# Patient Record
Sex: FEMALE | Race: WHITE | NOT HISPANIC OR LATINO | Employment: PART TIME | URBAN - METROPOLITAN AREA
[De-identification: names, ages, dates, MRNs, and addresses within clinical notes are randomized per-mention and may not be internally consistent; named-entity substitution may affect disease eponyms.]

---

## 2024-07-30 PROBLEM — M79.672 LEFT FOOT PAIN: Status: ACTIVE | Noted: 2024-07-30

## 2024-07-30 PROBLEM — J02.9 SORE THROAT: Status: ACTIVE | Noted: 2024-07-30

## 2024-07-30 PROBLEM — R05.9 COUGH: Status: ACTIVE | Noted: 2024-07-30

## 2024-07-30 PROBLEM — J02.9 ACUTE PHARYNGITIS: Status: ACTIVE | Noted: 2024-07-30

## 2024-07-30 PROBLEM — M19.079 ARTHRITIS OF MIDFOOT: Status: ACTIVE | Noted: 2024-07-30

## 2024-07-30 PROBLEM — T78.3XXA ANGIOEDEMA: Status: ACTIVE | Noted: 2024-07-30

## 2024-07-30 PROBLEM — E66.01 MORBID OBESITY (MULTI): Status: ACTIVE | Noted: 2024-07-30

## 2024-07-30 PROBLEM — M21.42 ACQUIRED PES PLANOVALGUS OF LEFT FOOT: Status: ACTIVE | Noted: 2024-07-30

## 2024-07-30 PROBLEM — L50.9 URTICARIA: Status: ACTIVE | Noted: 2024-07-30

## 2024-07-30 RX ORDER — LEVOTHYROXINE SODIUM 150 UG/1
TABLET ORAL
COMMUNITY
Start: 2017-01-31 | End: 2024-07-31 | Stop reason: WASHOUT

## 2024-07-30 RX ORDER — ERGOCALCIFEROL 1.25 MG/1
1 CAPSULE ORAL
COMMUNITY
Start: 2017-08-01

## 2024-07-30 RX ORDER — MINERAL OIL
1 ENEMA (ML) RECTAL DAILY
COMMUNITY
Start: 2017-07-31

## 2024-07-30 RX ORDER — BUPROPION HYDROCHLORIDE 100 MG/1
TABLET, EXTENDED RELEASE ORAL
COMMUNITY
Start: 2017-01-31 | End: 2024-07-31 | Stop reason: WASHOUT

## 2024-07-30 RX ORDER — PREDNISONE 50 MG/1
TABLET ORAL
COMMUNITY
Start: 2017-02-01

## 2024-07-30 RX ORDER — LISINOPRIL 10 MG/1
TABLET ORAL
COMMUNITY
Start: 2017-07-31

## 2024-07-30 RX ORDER — FLUOXETINE HYDROCHLORIDE 40 MG/1
CAPSULE ORAL
COMMUNITY
Start: 2017-01-31

## 2024-07-31 ENCOUNTER — OFFICE VISIT (OUTPATIENT)
Dept: UROLOGY | Facility: CLINIC | Age: 64
End: 2024-07-31
Payer: COMMERCIAL

## 2024-07-31 VITALS
BODY MASS INDEX: 42.59 KG/M2 | HEIGHT: 66 IN | SYSTOLIC BLOOD PRESSURE: 139 MMHG | DIASTOLIC BLOOD PRESSURE: 90 MMHG | WEIGHT: 265 LBS | HEART RATE: 77 BPM | TEMPERATURE: 98.1 F

## 2024-07-31 DIAGNOSIS — N39.3 STRESS INCONTINENCE: ICD-10-CM

## 2024-07-31 DIAGNOSIS — R39.15 URGENCY OF MICTURITION: ICD-10-CM

## 2024-07-31 DIAGNOSIS — R33.9 URINE RETENTION: ICD-10-CM

## 2024-07-31 DIAGNOSIS — N39.41 URGE INCONTINENCE: Primary | ICD-10-CM

## 2024-07-31 DIAGNOSIS — R35.1 NOCTURIA: ICD-10-CM

## 2024-07-31 DIAGNOSIS — F98.0 ENURESIS, PRIMARY, FUNCTIONAL: ICD-10-CM

## 2024-07-31 LAB
POC APPEARANCE, URINE: CLEAR
POC BILIRUBIN, URINE: NEGATIVE
POC BLOOD, URINE: ABNORMAL
POC COLOR, URINE: YELLOW
POC GLUCOSE, URINE: NEGATIVE MG/DL
POC KETONES, URINE: NEGATIVE MG/DL
POC LEUKOCYTES, URINE: NEGATIVE
POC NITRITE,URINE: NEGATIVE
POC PH, URINE: 6 PH
POC PROTEIN, URINE: NEGATIVE MG/DL
POC SPECIFIC GRAVITY, URINE: 1.01
POC UROBILINOGEN, URINE: 0.2 EU/DL

## 2024-07-31 PROCEDURE — 99213 OFFICE O/P EST LOW 20 MIN: CPT | Performed by: UROLOGY

## 2024-07-31 PROCEDURE — 3008F BODY MASS INDEX DOCD: CPT | Performed by: UROLOGY

## 2024-07-31 PROCEDURE — 81003 URINALYSIS AUTO W/O SCOPE: CPT | Performed by: UROLOGY

## 2024-07-31 RX ORDER — LEVOTHYROXINE SODIUM 125 UG/1
TABLET ORAL
COMMUNITY
Start: 2024-04-11

## 2024-07-31 RX ORDER — ALPRAZOLAM 0.5 MG/1
TABLET ORAL
COMMUNITY
Start: 2024-07-26

## 2024-07-31 RX ORDER — BUPROPION HYDROCHLORIDE 100 MG/1
TABLET ORAL
COMMUNITY
Start: 2024-07-11

## 2024-07-31 RX ORDER — AMOXICILLIN 500 MG/1
CAPSULE ORAL
COMMUNITY
Start: 2024-02-19

## 2024-07-31 RX ORDER — VIBEGRON 75 MG/1
TABLET, FILM COATED ORAL
COMMUNITY

## 2024-07-31 SDOH — ECONOMIC STABILITY: FOOD INSECURITY: WITHIN THE PAST 12 MONTHS, YOU WORRIED THAT YOUR FOOD WOULD RUN OUT BEFORE YOU GOT MONEY TO BUY MORE.: NEVER TRUE

## 2024-07-31 SDOH — ECONOMIC STABILITY: FOOD INSECURITY: WITHIN THE PAST 12 MONTHS, THE FOOD YOU BOUGHT JUST DIDN'T LAST AND YOU DIDN'T HAVE MONEY TO GET MORE.: NEVER TRUE

## 2024-07-31 ASSESSMENT — ENCOUNTER SYMPTOMS
OCCASIONAL FEELINGS OF UNSTEADINESS: 0
DEPRESSION: 0
LOSS OF SENSATION IN FEET: 0

## 2024-07-31 ASSESSMENT — COLUMBIA-SUICIDE SEVERITY RATING SCALE - C-SSRS
2. HAVE YOU ACTUALLY HAD ANY THOUGHTS OF KILLING YOURSELF?: NO
6. HAVE YOU EVER DONE ANYTHING, STARTED TO DO ANYTHING, OR PREPARED TO DO ANYTHING TO END YOUR LIFE?: NO
1. IN THE PAST MONTH, HAVE YOU WISHED YOU WERE DEAD OR WISHED YOU COULD GO TO SLEEP AND NOT WAKE UP?: NO

## 2024-07-31 ASSESSMENT — LIFESTYLE VARIABLES
SKIP TO QUESTIONS 9-10: 1
HOW OFTEN DO YOU HAVE SIX OR MORE DRINKS ON ONE OCCASION: NEVER
AUDIT-C TOTAL SCORE: 3
HOW MANY STANDARD DRINKS CONTAINING ALCOHOL DO YOU HAVE ON A TYPICAL DAY: 1 OR 2
HOW OFTEN DO YOU HAVE A DRINK CONTAINING ALCOHOL: 2-3 TIMES A WEEK

## 2024-07-31 ASSESSMENT — PATIENT HEALTH QUESTIONNAIRE - PHQ9
1. LITTLE INTEREST OR PLEASURE IN DOING THINGS: NOT AT ALL
2. FEELING DOWN, DEPRESSED OR HOPELESS: NOT AT ALL
SUM OF ALL RESPONSES TO PHQ9 QUESTIONS 1 AND 2: 0

## 2024-07-31 ASSESSMENT — PAIN SCALES - GENERAL: PAINLEVEL: 0-NO PAIN

## 2024-07-31 NOTE — PROGRESS NOTES
Subjective   Patient ID: Yumi Esqueda is a 64 y.o. female who presents for Urinary Retention (FOLLOW UP).    HPI  PT PRESENTS FOR A F/U ON HER VOIDING DYSFUNCTION.  PT WAS ON GEMTESA 75 MG /DAY -- IT HELPED SOME BUT NOT ENOUGH TO KEEP HER DRY.  PT WOULD LIKE TO TRY INTRAVESICAL BOTOX.  INSTEAD OF USING MORE OAB MEDS   Are you experiencing:  Burning on urination -- NO  Pain on urination  -- NO  Urinary frequency -- YES -- EVERY 2 HOURS ON AVG  Urinary urgency -- YES  Urge incontinence --YES  Urinary stress incontinence  -- YES-- OCC -- URGE INCONTINENCE IS WORSE  Number of pads used per day --- 2 -3 ON AVG -- DAMP TO WET  PT HAS ENEURESIS  Nocturia-- 3 -4 ON AVG  Hematuria -- NO  Hesitancy -- NO  Post void fullness --   NO     Review of Systems  General-- No C/O fever or chills  Head-- No C/O Dizziness  Eyes-- NO  C/O blurry or double vision  Ears-- No C/O hearing loss  Neck-- Supple  Chest-- No C/O pain or discomfort  Lungs-- No C/O shortness of breath  Abdomen-- No C/O  pain or discomfort, No nausea or vomiting  Back-- No C/O back pain or discomfort  Extremities-- No C/O swelling or pain    Objective   Physical Exam    General-- well developed, well nourished in NAD  Head-- normal cephalic, atraumatic  Eyes-- PERRL, EOM'S FROM,  no  jaundice  Neck-- Supple, without masses  Chest-- Normal bony structure  Abdomen-- soft, non tender, liver spleen not tender. No supra pubic masses  Back-- no flank masses palpable, no CVA tenderness on palpation or perc;ussion, OBESE  Lymph nodes-- No inguinal lymphadenopathy noted  Extremities -- Normal muscle mass and tone for the patients age  Neurological-- oriented times three    PVR 38 ml    Assessment/Plan  A:  PERSISTENT URINARY URGE INCONTINENCE -- PT DOES NOT WANT TO USE ANY MORE OAB MEDS AT THIS TIME -- SHE WOULD LIKE TO TRY INTRAVESICAL BOTOX  PROCEDURE, PROS, CONS DISCUSSED IN DETAIL  ALL QUESTIONS ANSWERED    P:  URINE FOR A MICRO -- PT HAS HEMOGLOBINURIA ON THE DIP  STICK  PRE CERT FOR  100 UNIT OF INTRAVESICAL BOTOX, OUT PT, JERRI FITCH.    Kishan Thomas MD 07/31/24 4:03 PM

## 2024-08-06 ENCOUNTER — PREP FOR PROCEDURE (OUTPATIENT)
Dept: UROLOGY | Facility: CLINIC | Age: 64
End: 2024-08-06
Payer: COMMERCIAL

## 2024-08-06 DIAGNOSIS — N39.41 URGENCY INCONTINENCE: Primary | ICD-10-CM

## 2024-08-06 DIAGNOSIS — N39.46 URGE AND STRESS INCONTINENCE: ICD-10-CM

## 2024-08-06 DIAGNOSIS — N39.41 URGE INCONTINENCE: ICD-10-CM

## 2024-08-06 RX ORDER — SODIUM CHLORIDE 9 MG/ML
100 INJECTION, SOLUTION INTRAVENOUS CONTINUOUS
OUTPATIENT
Start: 2024-08-06

## 2024-08-06 RX ORDER — CEFAZOLIN SODIUM 2 G/100ML
2 INJECTION, SOLUTION INTRAVENOUS ONCE
OUTPATIENT
Start: 2024-08-06 | End: 2024-08-06

## 2024-08-06 NOTE — H&P
History Of Present Illness  Yumi Esqueda is a 64 y.o. female presenting with URGE INCONTINENCE.     Past Medical History  She has a past medical history of Urinary incontinence.    Surgical History  She has no past surgical history on file.     Social History  She reports that she has quit smoking. Her smoking use included cigarettes. She has never used smokeless tobacco. She reports that she does not currently use alcohol. She reports that she does not use drugs.    Family History  Family History   Problem Relation Name Age of Onset    No Known Problems Father      Cancer Mother          Allergies  Patient has no known allergies.    Review of Systems -- SEE RECENT PHYSICAL EXAM     Physical Exam SEE RECENT PHYSICAL EXAM     Last Recorded Vitals  There were no vitals taken for this visit.    Relevant Results    No results found for this or any previous visit (from the past 24 hour(s)).    No results found.         Assessment/Plan       A:  URGE INCONTINENCE -- PT WOULD LIKE TO TRY INTRAVESICAL BOTOX 100 UNITS  P:  SCHEDULE:  CYSTOSCOPY, INTRAVESICAL BOTOX 100 UNITS , OUT PT, JERRI Thomas MD

## 2024-08-08 PROBLEM — N39.41 URGENCY INCONTINENCE: Status: ACTIVE | Noted: 2024-08-06

## 2024-08-22 ENCOUNTER — PRE-ADMISSION TESTING (OUTPATIENT)
Dept: PREADMISSION TESTING | Facility: HOSPITAL | Age: 64
End: 2024-08-22
Payer: COMMERCIAL

## 2024-08-22 VITALS
WEIGHT: 260.8 LBS | TEMPERATURE: 97.2 F | RESPIRATION RATE: 16 BRPM | SYSTOLIC BLOOD PRESSURE: 145 MMHG | BODY MASS INDEX: 41.91 KG/M2 | HEART RATE: 70 BPM | DIASTOLIC BLOOD PRESSURE: 81 MMHG | HEIGHT: 66 IN | OXYGEN SATURATION: 98 %

## 2024-08-22 DIAGNOSIS — N39.41 URGE INCONTINENCE: ICD-10-CM

## 2024-08-22 DIAGNOSIS — N39.41 URGENCY INCONTINENCE: ICD-10-CM

## 2024-08-22 LAB
ANION GAP SERPL CALC-SCNC: 14 MMOL/L (ref 10–20)
APPEARANCE UR: CLEAR
BACTERIA #/AREA URNS AUTO: ABNORMAL /HPF
BASOPHILS # BLD AUTO: 0.03 X10*3/UL (ref 0–0.1)
BASOPHILS NFR BLD AUTO: 0.5 %
BILIRUB UR STRIP.AUTO-MCNC: NEGATIVE MG/DL
BUN SERPL-MCNC: 17 MG/DL (ref 6–23)
CALCIUM SERPL-MCNC: 9.5 MG/DL (ref 8.6–10.3)
CHLORIDE SERPL-SCNC: 104 MMOL/L (ref 98–107)
CO2 SERPL-SCNC: 22 MMOL/L (ref 21–32)
COLOR UR: ABNORMAL
CREAT SERPL-MCNC: 0.77 MG/DL (ref 0.5–1.05)
EGFRCR SERPLBLD CKD-EPI 2021: 86 ML/MIN/1.73M*2
EOSINOPHIL # BLD AUTO: 0.05 X10*3/UL (ref 0–0.7)
EOSINOPHIL NFR BLD AUTO: 0.8 %
ERYTHROCYTE [DISTWIDTH] IN BLOOD BY AUTOMATED COUNT: 12.7 % (ref 11.5–14.5)
GLUCOSE SERPL-MCNC: 95 MG/DL (ref 74–99)
GLUCOSE UR STRIP.AUTO-MCNC: NORMAL MG/DL
HCT VFR BLD AUTO: 38.2 % (ref 36–46)
HGB BLD-MCNC: 13 G/DL (ref 12–16)
IMM GRANULOCYTES # BLD AUTO: 0.02 X10*3/UL (ref 0–0.7)
IMM GRANULOCYTES NFR BLD AUTO: 0.3 % (ref 0–0.9)
KETONES UR STRIP.AUTO-MCNC: NEGATIVE MG/DL
LEUKOCYTE ESTERASE UR QL STRIP.AUTO: ABNORMAL
LYMPHOCYTES # BLD AUTO: 1.71 X10*3/UL (ref 1.2–4.8)
LYMPHOCYTES NFR BLD AUTO: 28.6 %
MCH RBC QN AUTO: 30.4 PG (ref 26–34)
MCHC RBC AUTO-ENTMCNC: 34 G/DL (ref 32–36)
MCV RBC AUTO: 89 FL (ref 80–100)
MONOCYTES # BLD AUTO: 0.41 X10*3/UL (ref 0.1–1)
MONOCYTES NFR BLD AUTO: 6.9 %
MUCOUS THREADS #/AREA URNS AUTO: ABNORMAL /LPF
NEUTROPHILS # BLD AUTO: 3.76 X10*3/UL (ref 1.2–7.7)
NEUTROPHILS NFR BLD AUTO: 62.9 %
NITRITE UR QL STRIP.AUTO: NEGATIVE
NRBC BLD-RTO: 0 /100 WBCS (ref 0–0)
PH UR STRIP.AUTO: 6 [PH]
PLATELET # BLD AUTO: 290 X10*3/UL (ref 150–450)
POTASSIUM SERPL-SCNC: 4.3 MMOL/L (ref 3.5–5.3)
PROT UR STRIP.AUTO-MCNC: NEGATIVE MG/DL
RBC # BLD AUTO: 4.28 X10*6/UL (ref 4–5.2)
RBC # UR STRIP.AUTO: ABNORMAL /UL
RBC #/AREA URNS AUTO: ABNORMAL /HPF
SODIUM SERPL-SCNC: 136 MMOL/L (ref 136–145)
SP GR UR STRIP.AUTO: 1.02
SQUAMOUS #/AREA URNS AUTO: ABNORMAL /HPF
UROBILINOGEN UR STRIP.AUTO-MCNC: NORMAL MG/DL
WBC # BLD AUTO: 6 X10*3/UL (ref 4.4–11.3)
WBC #/AREA URNS AUTO: ABNORMAL /HPF

## 2024-08-22 PROCEDURE — 99203 OFFICE O/P NEW LOW 30 MIN: CPT | Performed by: NURSE PRACTITIONER

## 2024-08-22 PROCEDURE — 93005 ELECTROCARDIOGRAM TRACING: CPT

## 2024-08-22 PROCEDURE — 36415 COLL VENOUS BLD VENIPUNCTURE: CPT

## 2024-08-22 PROCEDURE — 80048 BASIC METABOLIC PNL TOTAL CA: CPT

## 2024-08-22 PROCEDURE — 87086 URINE CULTURE/COLONY COUNT: CPT | Mod: PARLAB

## 2024-08-22 PROCEDURE — 81003 URINALYSIS AUTO W/O SCOPE: CPT

## 2024-08-22 PROCEDURE — 85025 COMPLETE CBC W/AUTO DIFF WBC: CPT

## 2024-08-22 NOTE — PREPROCEDURE INSTRUCTIONS
Medication List            Accurate as of August 22, 2024  1:24 PM. Always use your most recent med list.                Allegra Allergy 180 mg tablet  Generic drug: fexofenadine  Medication Adjustments for Surgery: Other (Comment)  Notes to patient: Continue as prescribed     ALPRAZolam 0.5 mg tablet  Commonly known as: Xanax  Medication Adjustments for Surgery: Other (Comment)  Notes to patient: Continue as prescribed     amoxicillin 500 mg capsule  Commonly known as: Amoxil  Medication Adjustments for Surgery: Other (Comment)  Notes to patient: Continue as prescribed     buPROPion 100 mg tablet  Commonly known as: Wellbutrin  Medication Adjustments for Surgery: Other (Comment)  Notes to patient: Continue as prescribed     ergocalciferol 1.25 MG (16995 UT) capsule  Commonly known as: Vitamin D-2  Medication Adjustments for Surgery: Stop 7 days before surgery     Gemtesa 75 mg tablet  Generic drug: vibegron  Medication Adjustments for Surgery: Other (Comment)  Notes to patient: Continue as prescribed     lisinopril 10 mg tablet  Medication Adjustments for Surgery: Other (Comment)  Notes to patient: Do not take night before or day of surgery - (Do not take 24 hours before surgery)     predniSONE 50 mg tablet  Commonly known as: Deltasone  Medication Adjustments for Surgery: Other (Comment)  Notes to patient: Continue as prescribed     PROzac 40 mg capsule  Generic drug: FLUoxetine  Medication Adjustments for Surgery: Other (Comment)  Notes to patient: Continue as prescribed     raNITIdine 75 mg tablet  Commonly known as: Zantac  Medication Adjustments for Surgery: Other (Comment)  Notes to patient: Continue as prescribed     Synthroid 125 mcg tablet  Generic drug: levothyroxine  Medication Adjustments for Surgery: Other (Comment)  Notes to patient: Continue as prescribed            PRE-OPERATIVE INSTRUCTIONS FOR SURGERY    *Do not eat anything after midnight the night of surgery.  This includes food of any kind  (including hard candy, cough drops, mints).   You may have up to 13.5 ounces of clear liquid  until TWO hours prior to your arrival time to the hospital  Clear liquids include water, black tea/coffee, (no milk or cream) apple juice and electrolyte drinks (GATORADE).  You may chew gum until TWO hours prior you your surgery/procedure.       PLEASE REVIEW YOUR MEDICATION LIST so as to identify which medications should be held or stopped prior to surgery, as well as what medications must be taken the morning of your procedure with sips of clear liquid.      ------------------------------------------------    *One of our staff members will call you ONE business day before your surgery, between 11am-2 pm to let you know the time to arrive.    If you have not received a call by 2 pm, call [phone]    *When you arrive at the hospital-->GO TO Registration on the ground floor    *Stop smoking 24 hours prior to surgery.  No Marijuana, CBD Oil or Vaping for 48 hours    *No alcohol 24 hours prior to surgery    *You will need a responsible adult to drive you home    -No acrylic nails or nail polish on at least one fingernail, NO polish on toes for foot surgery    Take a shower prior to surgery so as to remove all body residue from applied products such as lotions, perfumes, makeup, lotions, and deodorant.  DO NOT REAPPLY ANY OF THESE ITEMS AFTER SHOWERING.    -You may be asked to remove your dentures, partial plate, eyeglasses or contact lenses before going to surgery.  Please bring a case for these items.    -Body piercings need to be removed.  Jewelry and valuables should be left at home.    -Put on loose,  comfortable, clean clothing, that will accommodate bandages    -------------------------------------------------      What you may be asked to bring to surgery:     ___PHOTO ID and insurance information      -------------------------------------------------    NPO Instructions:    Do not eat any food after midnight the  night before your surgery/procedure.  You may have clear liquids until TWO hours before surgery/procedure. This includes water, black tea/coffee, (no milk or cream) apple juice and electrolyte drinks (Gatorade).  You may chew gum up to TWO hours before your surgery/procedure.    Additional Instructions:     Day of Surgery:  You may have clear liquids until TWO hours before surgery/procedure.  This includes water, black tea/coffee, (no milk or cream) apple juice and electrolyte drinks (Gatorade)  You may chew gum up to TWO hours before your surgery/procedure  Wear  comfortable loose fitting clothing  Do not use moisturizers, creams, lotions or perfume  All jewelry and valuables should be left at home      -------------------------------------------------

## 2024-08-22 NOTE — CPM/PAT H&P
CPM/PAT Evaluation       Name: Yumi Esqueda (Yumi Esqueda)  /Age: 64 y.o.     In-Person       Chief Complaint: Urgency incontinence    64 yr old female with c/o urge incontinence. Reports years long bothersome urinary symptoms including frequency, urgency, nocturia (4x per night) and incontinence with need to wear pads.  Denies discomfort, hematuria, difficulty emptying bladder or recent infection.  Has tried various treatments including medication (gemtesa) with no lasting relief.  Reports remaining otherwise physically active, enjoying yard work and swimming; denies cardiac or respiratory symptoms.  Reports hx of post op nausea.       Followed by PCP (BRENDA Cardenas) - last visit this year        Past Medical History:   Diagnosis Date    PONV (postoperative nausea and vomiting)     Urinary incontinence        Past Surgical History:   Procedure Laterality Date    FOOT SURGERY Left 2024    KNEE ARTHROPLASTY Left     TOTAL SHOULDER ARTHROPLASTY Bilateral      and        Patient  has no history on file for sexual activity.    Family History   Problem Relation Name Age of Onset    No Known Problems Father      Cancer Mother         No Known Allergies    Prior to Admission medications    Medication Sig Start Date End Date Taking? Authorizing Provider   ALPRAZolam (Xanax) 0.5 mg tablet TAKE 1 TABLET BY MOUTH TWICE DAILY FOR ANXIETY and insomnia AS NEEDED 24   Historical Provider, MD   amoxicillin (Amoxil) 500 mg capsule TAKE BY MOUTH AS DIRECTED ( TAKE 2 CAPSULES BY MOUTH 1 HOUR PRIOR TO DENTAL PROCEDURE AND THEN TAKE 2 CAPSULES BY MOUTH 6 HOURS AFTER DENTAL 24   Historical Provider, MD   buPROPion (Wellbutrin) 100 mg tablet TAKES 1/2 TABLET 24   Historical Provider, MD   ergocalciferol (Vitamin D-2) 1.25 MG (41397 UT) capsule Take 1 capsule (1,250 mcg) by mouth 1 (one) time per week. 17   Historical Provider, MD   fexofenadine (Allegra Allergy) 180 mg tablet Take 1  tablet (180 mg) by mouth once daily. 7/31/17   Historical Provider, MD   FLUoxetine (PROzac) 40 mg capsule Take by mouth. 1/31/17   Historical Provider, MD   lisinopril 10 mg tablet Take by mouth. 7/31/17   Historical Provider, MD   predniSONE (Deltasone) 50 mg tablet Take by mouth. 2/1/17   Historical Provider, MD   raNITIdine (Zantac) 75 mg tablet Take by mouth. 1/31/17   Historical Provider, MD   Synthroid 125 mcg tablet  4/11/24   Historical Provider, MD   vibegron (Gemtesa) 75 mg tablet     Historical Provider, MD        Review of Systems    Constitutional: no fever, no chills and not feeling poorly.   Eyes: no eyesight problems.   ENT: no hearing loss, no nosebleeds and no sore throat.   Cardiovascular: no chest pain, no palpitations and no extremity edema.   Respiratory: no shortness of breath, no wheezing, no cough and no sob w/exertion, EUNICE w/no CPAP.   Gastrointestinal: negative for abdominal pain, blood in stools or changes in bowel habits   Genitourinary: see HPI  Musculoskeletal: no arthralgias and no gait abnormality.   Integumentary: negative for lesions, rash or itching.   Neurological: negative for confusion, dizziness or fainting.   Psychiatric: not suicidal, no anxiety and no depression.   All other systems have been reviewed and are negative for complaint.     Physical Exam  Constitutional:       General: No acute distress.     Aox3, pleasant and cooperative, appropriate mood and eye contact   HENT:      Head: Normocephalic.      Mouth/Throat: Mucous membranes moist & pink  Eyes:      Vision grossly intact, PERRLA   Neck:      No carotid bruit, no JVD  Cardiovascular:      RRR, S1S2, no murmurs, rubs or gallops  Pulmonary:      Symmetric chest expansion, CTA, Room Air  Abdominal:      Soft non-tender, BSx4   Skin:     Warm, dry & intact   Extremities:      No gross deformities; normal gait   Neurological:      No focal deficit, Aox3, MCINTYRE x4  Psychiatric:      Pleasant & cooperative, appropriate  affect    PAT AIRWAY:   Airway:     Mallampati::  I    TM distance::  >3 FB    Neck ROM::  Full  normal        Visit Vitals  /81   Pulse 70   Temp 36.2 °C (97.2 °F)   Resp 16       DASI Risk Score    No data to display       Caprini DVT Assessment    No data to display       Modified Frailty Index    No data to display       CHADS2 Stroke Risk  Current as of 18 minutes ago        N/A 3 to 100%: High Risk   2 to < 3%: Medium Risk   0 to < 2%: Low Risk     Last Change: N/A          This score determines the patient's risk of having a stroke if the patient has atrial fibrillation.        This score is not applicable to this patient. Components are not calculated.          Revised Cardiac Risk Index    No data to display       Apfel Simplified Score    No data to display       Risk Analysis Index Results This Encounter    No data found in the last 1 encounters.       Stop Bang Score      Flowsheet Row Most Recent Value   Do you snore loudly? 0   Do you often feel tired or fatigued after your sleep? 0   Do you have or are you being treated for high blood pressure? 1   Recent BMI (Calculated) 42.8   Is BMI greater than 35 kg/m2? 1=Yes   Age older than 50 years old? 1=Yes   Is your neck circumference greater than 17 inches (Male) or 16 inches (Female)? 0   Gender - Male 0=No            Assessment and Plan:     Followed by urology, urgency incontinence    Cystoscopy and botulinum toxin injection w/Dr Thomas on 9/3/2024    Ecg performed today - NSR (71 bpm)

## 2024-08-23 LAB
ATRIAL RATE: 71 BPM
BACTERIA UR CULT: NORMAL
HOLD SPECIMEN: NORMAL
P AXIS: 50 DEGREES
P OFFSET: 201 MS
P ONSET: 143 MS
PR INTERVAL: 160 MS
Q ONSET: 223 MS
QRS COUNT: 12 BEATS
QRS DURATION: 78 MS
QT INTERVAL: 410 MS
QTC CALCULATION(BAZETT): 445 MS
QTC FREDERICIA: 433 MS
R AXIS: 2 DEGREES
T AXIS: 30 DEGREES
T OFFSET: 428 MS
VENTRICULAR RATE: 71 BPM

## 2024-08-26 LAB
ATRIAL RATE: 71 BPM
P AXIS: 50 DEGREES
P OFFSET: 201 MS
P ONSET: 143 MS
PR INTERVAL: 160 MS
Q ONSET: 223 MS
QRS COUNT: 12 BEATS
QRS DURATION: 78 MS
QT INTERVAL: 410 MS
QTC CALCULATION(BAZETT): 445 MS
QTC FREDERICIA: 433 MS
R AXIS: 2 DEGREES
T AXIS: 30 DEGREES
T OFFSET: 428 MS
VENTRICULAR RATE: 71 BPM

## 2024-09-03 ENCOUNTER — ANESTHESIA (OUTPATIENT)
Dept: OPERATING ROOM | Facility: HOSPITAL | Age: 64
End: 2024-09-03
Payer: COMMERCIAL

## 2024-09-03 ENCOUNTER — HOSPITAL ENCOUNTER (OUTPATIENT)
Facility: HOSPITAL | Age: 64
Setting detail: OUTPATIENT SURGERY
Discharge: HOME | End: 2024-09-03
Attending: UROLOGY | Admitting: UROLOGY
Payer: COMMERCIAL

## 2024-09-03 ENCOUNTER — ANESTHESIA EVENT (OUTPATIENT)
Dept: OPERATING ROOM | Facility: HOSPITAL | Age: 64
End: 2024-09-03
Payer: COMMERCIAL

## 2024-09-03 VITALS
TEMPERATURE: 98.2 F | BODY MASS INDEX: 41.99 KG/M2 | HEART RATE: 60 BPM | OXYGEN SATURATION: 98 % | WEIGHT: 260.14 LBS | DIASTOLIC BLOOD PRESSURE: 70 MMHG | SYSTOLIC BLOOD PRESSURE: 129 MMHG | RESPIRATION RATE: 20 BRPM

## 2024-09-03 DIAGNOSIS — N39.41 URGENCY INCONTINENCE: ICD-10-CM

## 2024-09-03 PROBLEM — G47.33 OSA (OBSTRUCTIVE SLEEP APNEA): Status: ACTIVE | Noted: 2024-09-03

## 2024-09-03 PROCEDURE — 2720000007 HC OR 272 NO HCPCS: Performed by: UROLOGY

## 2024-09-03 PROCEDURE — 3600000008 HC OR TIME - EACH INCREMENTAL 1 MINUTE - PROCEDURE LEVEL THREE: Performed by: UROLOGY

## 2024-09-03 PROCEDURE — 2500000005 HC RX 250 GENERAL PHARMACY W/O HCPCS: Performed by: ANESTHESIOLOGIST ASSISTANT

## 2024-09-03 PROCEDURE — 3700000001 HC GENERAL ANESTHESIA TIME - INITIAL BASE CHARGE: Performed by: UROLOGY

## 2024-09-03 PROCEDURE — 2500000004 HC RX 250 GENERAL PHARMACY W/ HCPCS (ALT 636 FOR OP/ED): Performed by: ANESTHESIOLOGIST ASSISTANT

## 2024-09-03 PROCEDURE — 7100000009 HC PHASE TWO TIME - INITIAL BASE CHARGE: Performed by: UROLOGY

## 2024-09-03 PROCEDURE — 7100000002 HC RECOVERY ROOM TIME - EACH INCREMENTAL 1 MINUTE: Performed by: UROLOGY

## 2024-09-03 PROCEDURE — 7100000001 HC RECOVERY ROOM TIME - INITIAL BASE CHARGE: Performed by: UROLOGY

## 2024-09-03 PROCEDURE — A52287 PR CYSTOURETHROSCOPY INJ CHEMODENERVATION BLADDER: Performed by: ANESTHESIOLOGIST ASSISTANT

## 2024-09-03 PROCEDURE — 3700000002 HC GENERAL ANESTHESIA TIME - EACH INCREMENTAL 1 MINUTE: Performed by: UROLOGY

## 2024-09-03 PROCEDURE — 96372 THER/PROPH/DIAG INJ SC/IM: CPT | Performed by: UROLOGY

## 2024-09-03 PROCEDURE — 3600000003 HC OR TIME - INITIAL BASE CHARGE - PROCEDURE LEVEL THREE: Performed by: UROLOGY

## 2024-09-03 PROCEDURE — A52287 PR CYSTOURETHROSCOPY INJ CHEMODENERVATION BLADDER: Performed by: ANESTHESIOLOGY

## 2024-09-03 PROCEDURE — 2500000004 HC RX 250 GENERAL PHARMACY W/ HCPCS (ALT 636 FOR OP/ED): Performed by: UROLOGY

## 2024-09-03 PROCEDURE — 52287 CYSTOSCOPY CHEMODENERVATION: CPT | Performed by: UROLOGY

## 2024-09-03 PROCEDURE — 7100000010 HC PHASE TWO TIME - EACH INCREMENTAL 1 MINUTE: Performed by: UROLOGY

## 2024-09-03 RX ORDER — ACETAMINOPHEN 325 MG/1
650 TABLET ORAL EVERY 4 HOURS PRN
Status: DISCONTINUED | OUTPATIENT
Start: 2024-09-03 | End: 2024-09-03 | Stop reason: HOSPADM

## 2024-09-03 RX ORDER — MIDAZOLAM HYDROCHLORIDE 1 MG/ML
INJECTION, SOLUTION INTRAMUSCULAR; INTRAVENOUS AS NEEDED
Status: DISCONTINUED | OUTPATIENT
Start: 2024-09-03 | End: 2024-09-03

## 2024-09-03 RX ORDER — SODIUM CHLORIDE, SODIUM LACTATE, POTASSIUM CHLORIDE, CALCIUM CHLORIDE 600; 310; 30; 20 MG/100ML; MG/100ML; MG/100ML; MG/100ML
100 INJECTION, SOLUTION INTRAVENOUS CONTINUOUS
Status: DISCONTINUED | OUTPATIENT
Start: 2024-09-03 | End: 2024-09-03 | Stop reason: HOSPADM

## 2024-09-03 RX ORDER — MEPERIDINE HYDROCHLORIDE 25 MG/ML
12.5 INJECTION INTRAMUSCULAR; INTRAVENOUS; SUBCUTANEOUS EVERY 10 MIN PRN
Status: DISCONTINUED | OUTPATIENT
Start: 2024-09-03 | End: 2024-09-03 | Stop reason: HOSPADM

## 2024-09-03 RX ORDER — GABAPENTIN 300 MG/1
300 CAPSULE ORAL NIGHTLY
COMMUNITY

## 2024-09-03 RX ORDER — HYDROMORPHONE HYDROCHLORIDE 1 MG/ML
1 INJECTION, SOLUTION INTRAMUSCULAR; INTRAVENOUS; SUBCUTANEOUS EVERY 5 MIN PRN
Status: DISCONTINUED | OUTPATIENT
Start: 2024-09-03 | End: 2024-09-03 | Stop reason: HOSPADM

## 2024-09-03 RX ORDER — CEFAZOLIN 1 G/1
INJECTION, POWDER, FOR SOLUTION INTRAVENOUS AS NEEDED
Status: DISCONTINUED | OUTPATIENT
Start: 2024-09-03 | End: 2024-09-03

## 2024-09-03 RX ORDER — PROPOFOL 10 MG/ML
INJECTION, EMULSION INTRAVENOUS AS NEEDED
Status: DISCONTINUED | OUTPATIENT
Start: 2024-09-03 | End: 2024-09-03

## 2024-09-03 RX ORDER — SODIUM CHLORIDE 9 MG/ML
100 INJECTION, SOLUTION INTRAVENOUS CONTINUOUS
Status: DISCONTINUED | OUTPATIENT
Start: 2024-09-03 | End: 2024-09-03 | Stop reason: HOSPADM

## 2024-09-03 RX ORDER — PHENYLEPHRINE HCL IN 0.9% NACL 1 MG/10 ML
SYRINGE (ML) INTRAVENOUS AS NEEDED
Status: DISCONTINUED | OUTPATIENT
Start: 2024-09-03 | End: 2024-09-03

## 2024-09-03 RX ORDER — NITROFURANTOIN 25; 75 MG/1; MG/1
100 CAPSULE ORAL EVERY 12 HOURS SCHEDULED
Status: DISCONTINUED | OUTPATIENT
Start: 2024-09-03 | End: 2024-09-03 | Stop reason: HOSPADM

## 2024-09-03 RX ORDER — CEFAZOLIN SODIUM 2 G/100ML
2 INJECTION, SOLUTION INTRAVENOUS ONCE
Status: DISCONTINUED | OUTPATIENT
Start: 2024-09-03 | End: 2024-09-03 | Stop reason: HOSPADM

## 2024-09-03 RX ORDER — MIDAZOLAM HYDROCHLORIDE 1 MG/ML
1 INJECTION, SOLUTION INTRAMUSCULAR; INTRAVENOUS ONCE AS NEEDED
Status: DISCONTINUED | OUTPATIENT
Start: 2024-09-03 | End: 2024-09-03 | Stop reason: HOSPADM

## 2024-09-03 RX ORDER — HYDRALAZINE HYDROCHLORIDE 20 MG/ML
5 INJECTION INTRAMUSCULAR; INTRAVENOUS EVERY 30 MIN PRN
Status: DISCONTINUED | OUTPATIENT
Start: 2024-09-03 | End: 2024-09-03 | Stop reason: HOSPADM

## 2024-09-03 RX ORDER — ONDANSETRON HYDROCHLORIDE 2 MG/ML
4 INJECTION, SOLUTION INTRAVENOUS ONCE AS NEEDED
Status: DISCONTINUED | OUTPATIENT
Start: 2024-09-03 | End: 2024-09-03 | Stop reason: HOSPADM

## 2024-09-03 RX ORDER — ONDANSETRON HYDROCHLORIDE 2 MG/ML
INJECTION, SOLUTION INTRAVENOUS AS NEEDED
Status: DISCONTINUED | OUTPATIENT
Start: 2024-09-03 | End: 2024-09-03

## 2024-09-03 RX ORDER — LIDOCAINE HYDROCHLORIDE 20 MG/ML
INJECTION, SOLUTION INFILTRATION; PERINEURAL AS NEEDED
Status: DISCONTINUED | OUTPATIENT
Start: 2024-09-03 | End: 2024-09-03

## 2024-09-03 RX ORDER — NITROFURANTOIN 25; 75 MG/1; MG/1
100 CAPSULE ORAL 2 TIMES DAILY
Qty: 6 CAPSULE | Refills: 0 | Status: SHIPPED | OUTPATIENT
Start: 2024-09-03

## 2024-09-03 RX ORDER — DIPHENHYDRAMINE HYDROCHLORIDE 50 MG/ML
12.5 INJECTION INTRAMUSCULAR; INTRAVENOUS ONCE AS NEEDED
Status: DISCONTINUED | OUTPATIENT
Start: 2024-09-03 | End: 2024-09-03 | Stop reason: HOSPADM

## 2024-09-03 RX ORDER — LABETALOL HYDROCHLORIDE 5 MG/ML
5 INJECTION, SOLUTION INTRAVENOUS ONCE AS NEEDED
Status: DISCONTINUED | OUTPATIENT
Start: 2024-09-03 | End: 2024-09-03 | Stop reason: HOSPADM

## 2024-09-03 RX ORDER — FENTANYL CITRATE 50 UG/ML
INJECTION, SOLUTION INTRAMUSCULAR; INTRAVENOUS AS NEEDED
Status: DISCONTINUED | OUTPATIENT
Start: 2024-09-03 | End: 2024-09-03

## 2024-09-03 SDOH — HEALTH STABILITY: MENTAL HEALTH: CURRENT SMOKER: 0

## 2024-09-03 ASSESSMENT — PAIN - FUNCTIONAL ASSESSMENT
PAIN_FUNCTIONAL_ASSESSMENT: 0-10
PAIN_FUNCTIONAL_ASSESSMENT: 0-10

## 2024-09-03 ASSESSMENT — PAIN SCALES - GENERAL
PAIN_LEVEL: 0
PAINLEVEL_OUTOF10: 0 - NO PAIN

## 2024-09-03 NOTE — OP NOTE
CYSTOSCOPY, WITH BOTOX INJECTION Operative Note     Date: 9/3/2024  OR Location: [facility]    Name: Yumi Esqueda, : 1960, Age: 64 y.o., MRN: [id number], Sex: female    Diagnosis  Pre-op Diagnosis      * Urgency incontinence [N39.41] Post-op Diagnosis     * Urgency incontinence [N39.41]     Procedures  CYSTOSCOPY  32734 - [place] CYSTOURETHROSCOPY INJ CHEMODENERVATION BLADDER    WITH BOTOX INJECTION  66751 - [place] CYSTOURETHROSCOPY    BOTOX 1 UNIT [BOTOX]  Surgeons      * Kishan Thomas - Primary    Resident/Fellow/Other Assistant:  Surgeons and Role:  * No surgeons found with a matching role *    Procedure Summary  Anesthesia: General  ASA: III  Anesthesia Staff: Anesthesiologist: Mark Rodriguez MD  C-AA: PEYTON Camarillo  DARLIN: Vish Matta  Estimated Blood Loss: 0 mL  Intra-op Medications:   Administrations occurring from 1200 to 1300 on 24:   Medication Name Total Dose   onabotulinumtoxinA (Botox) injection 100 Units 100 Units   sodium chloride 0.9% infusion 51.67 mL              Anesthesia Record               Intraprocedure I/O Totals       None           Specimen: No specimens collected     Staff:   Scrub Person: Raj  Circulator: Stacey  Circulator: Mainor         Drains and/or Catheters: * None in log *    Tourniquet Times:         Implants:     Findings: See operative note    Indications: Yumi Esqueda is an 64 y.o. female who is having surgery for Urgency incontinence [N39.41].  Patient has a long history of urinary urge incontinence and has failed oral medication.      The patient was seen in the preoperative area. The risks, benefits, complications, treatment options, non-operative alternatives, expected recovery and outcomes were discussed with the patient. The possibilities of reaction to medication, pulmonary aspiration, injury to surrounding structures, bleeding, recurrent infection, the need for additional procedures, failure to diagnose a condition, and creating a complication requiring  transfusion or operation were discussed with the patient. The patient concurred with the proposed plan, giving informed consent.  The site of surgery was properly noted/marked if necessary per policy. The patient has been actively warmed in preoperative area. Preoperative antibiotics have been ordered and given within 1 hours of incision. Venous thrombosis prophylaxis have been ordered including bilateral sequential compression devices    Procedure Details: Brief op note  The patient was brought into the operating room placed on the operating table in the supine position and a huddle was performed.  After the patient acknowledged that she was going to undergo a cystoscopy and intravesical injection of 100 units of Botox she was placed under a general anesthetic.  After adequate general anesthesia was induced she was placed in the dorsolithotomy position.  The perineum was then prepped and draped in usual sterile manner.  A second huddle was performed and everyone in the room agreed that the patient was to undergo cystoscopy and intravesical injection of 100 units of Botox.  At this point the Botox was  reconstituted by taking 1 1 bottle of 100 unit Botox and adding 10 mL of normal saline to it.  This was drawn up into a separate syringe.  1 mL of normal saline was drawn up in a second syringe.  This point a #22 Kisw[place]ili Storz cystoscope was introduced into the bladder atraumatically.  The bladder  was inspected and no tumors, stones, foreign bodies or diverticuli were identified.  The ureteral orifice ease were in the normal anatomic position and refluxing clear urine bilaterally.  Using a flexible needle the Botox was then injected into the bladder muscle and 1 mL amounts spacing it evenly between both sides of the bladder.  The last injection was done by utilizing the 1 mL of normal saline.  After ensuring adequate hemostasis the bladder was drained and the cystoscope was removed.  The patient was placed in the  supine position, awakened and transferred to the recovery room in satisfactory condition.  Complications:  None; patient tolerated the procedure well.    Disposition: PACU - hemodynamically stable.  Condition:          Additional Details: none    Attending Attestation: I was present and scrubbed for the entire procedure.    Kishan Thomas  Phone Number: [phone]

## 2024-09-03 NOTE — ANESTHESIA PREPROCEDURE EVALUATION
Patient: Yumi Esqueda    Procedure Information       Date/Time: 09/03/24 1200    Procedures:       CYSTOSCOPY - cystoscopy with botox 100 (15 miutes)      WITH BOTOX INJECTION    Location: [facility] / [facility]    Surgeons: Kishan Thomas MD            Relevant Problems   Anesthesia (within normal limits)      Pulmonary   (+) EUNICE (obstructive sleep apnea)      Endocrine   (+) Morbid obesity (Multi)       Clinical information reviewed:   Tobacco  Allergies  Meds   Med Hx  Surg Hx  OB Status  Fam Hx          NPO Detail:  NPO/Void Status  Date of Last Liquid: 09/03/24  Time of Last Liquid: 0830  Date of Last Solid: 09/02/24  Time of Last Solid: 2300         Physical Exam    Airway  Mallampati: II  TM distance: >3 FB  Neck ROM: full     Cardiovascular - normal exam  Rhythm: regular  Rate: normal     Dental - normal exam     Pulmonary - normal exam     Abdominal            Anesthesia Plan    History of general anesthesia?: yes  History of complications of general anesthesia?: no    ASA 3     general     The patient is not a current smoker.  Education provided regarding risk of obstructive sleep apnea.  intravenous induction   Postoperative administration of opioids is intended.  Trial extubation is planned.  Anesthetic plan and risks discussed with patient.  Use of blood products discussed with patient who.    Plan discussed with CAA.

## 2024-09-03 NOTE — ANESTHESIA POSTPROCEDURE EVALUATION
Patient: Yumi Esqueda    Procedure Summary       Date: 09/03/24 Room / Location: [facility] / [facility]    Anesthesia Start: 1227 Anesthesia Stop:     Procedures:       CYSTOSCOPY (Bladder)      WITH BOTOX INJECTION (Bladder) Diagnosis:       Urgency incontinence      (Urgency incontinence [N39.41])    Surgeons: Kishan Thomas MD Responsible Provider: Mark Rodriguez MD    Anesthesia Type: general ASA Status: 3            Anesthesia Type: general    Vitals Value Taken Time   /67 09/03/24 1301   Temp 36.8 09/03/24 1301   Pulse 71 09/03/24 1301   Resp 16 09/03/24 1301   SpO2 100 09/03/24 1301       Anesthesia Post Evaluation    Patient location during evaluation: PACU  Patient participation: complete - patient participated  Level of consciousness: sedated  Pain score: 0  Pain management: adequate  Airway patency: patent  Cardiovascular status: acceptable  Respiratory status: acceptable  Hydration status: acceptable  Postoperative Nausea and Vomiting: none        There were no known notable events for this encounter.

## 2024-09-03 NOTE — ANESTHESIA PROCEDURE NOTES
Airway  Date/Time: 9/3/2024 12:32 PM  Urgency: elective    Airway not difficult    Staffing  Performed: DARLIN   Authorized by: Mark Rodriguez MD    Performed by: PEYTON Camarillo  Patient location during procedure: OR    Indications and Patient Condition  Indications for airway management: anesthesia  Spontaneous Ventilation: absent  Sedation level: deep  Preoxygenated: yes  Mask difficulty assessment: 1 - vent by mask    Final Airway Details  Final airway type: supraglottic airway      Successful airway: classic  Size 4     Number of attempts at approach: 1  Ventilation between attempts: none  Number of other approaches attempted: 0

## 2024-10-07 ENCOUNTER — APPOINTMENT (OUTPATIENT)
Dept: UROLOGY | Facility: CLINIC | Age: 64
End: 2024-10-07
Payer: COMMERCIAL

## 2024-10-21 ENCOUNTER — APPOINTMENT (OUTPATIENT)
Dept: UROLOGY | Facility: CLINIC | Age: 64
End: 2024-10-21
Payer: COMMERCIAL

## 2024-11-17 NOTE — PROGRESS NOTES
Subjective   Patient ID: Yumi Esqueda is a 64 y.o. female who presents for A F/U AFTER UNDERGOING A CYSTO, INTRAVESICAL INJECTION  UNITS OHPI  Are you experiencing:  Burning on urination --  Pain on urination  --  Urinary frequency --  Urinary urgency --  Urge incontinence --  Urinary stress incontinence  --   Number of pads used per day --  Eneuresis --   Nocturia--  Hematuria --  Hesitancy --  Post void fullness --  F BOTOX ON 9-3-24     A:    P:      Kishan Thomas MD 11/17/24 12:40 PM

## 2024-11-18 ENCOUNTER — APPOINTMENT (OUTPATIENT)
Dept: UROLOGY | Facility: CLINIC | Age: 64
End: 2024-11-18
Payer: COMMERCIAL

## 2024-11-18 DIAGNOSIS — N39.41 URGE INCONTINENCE: ICD-10-CM

## 2024-12-01 NOTE — PROGRESS NOTES
Subjective   Patient ID: Yumi Esqueda is a 64 y.o. female who presents for A F/U AFTER UNDERGOING A CYSTO, INTRAVESICAL INJECTION  UNITS ON 9-3-24--  PT'S URGENCY AND URGE INCONTINENCE ARE MUCH BETTER. PT IS 90% BETTER OVER HER BASELINE SXS  HPI  Are you experiencing:  Burning on urination --NO  Pain on urination  -- NO  Urinary frequency -- MUCH BETTER  Urinary urgency -- NO  Urge incontinence --NO  Urinary stress incontinence  --NO   Number of pads used per day -- NONE  Eneuresis -- NO  Nocturia-- 2 X ON AVG  Hematuria --NO  Hesitancy --NO  Post void fullness --  NO    PVR -- 0 ML    URINALYSIS DIPSTICK-- MOD HEMOGLOBINURIA, TRACE OF LEUKOCYTES    A:  PT S/P CYSTO, INTRAVESICAL INJECTION  UNITS OF BOTOX 9-3-24-- DOING WELL OVERALL   P:  REASSURANCE, EDUCATION, SUPPORTIVE CARE RENDERED TO THE PT   F/U WHEN THE VOIDING SXS RETURN    Kishan Thomas MD 11/17/24 12:40 PM

## 2024-12-02 ENCOUNTER — OFFICE VISIT (OUTPATIENT)
Dept: UROLOGY | Facility: CLINIC | Age: 64
End: 2024-12-02
Payer: COMMERCIAL

## 2024-12-02 VITALS
TEMPERATURE: 98.2 F | RESPIRATION RATE: 16 BRPM | BODY MASS INDEX: 40.98 KG/M2 | WEIGHT: 255 LBS | SYSTOLIC BLOOD PRESSURE: 143 MMHG | HEIGHT: 66 IN | HEART RATE: 75 BPM | DIASTOLIC BLOOD PRESSURE: 81 MMHG

## 2024-12-02 DIAGNOSIS — R39.15 URGENCY OF MICTURITION: Primary | ICD-10-CM

## 2024-12-02 DIAGNOSIS — R35.0 FREQUENCY OF MICTURITION: ICD-10-CM

## 2024-12-02 DIAGNOSIS — N39.41 URGE INCONTINENCE OF URINE: ICD-10-CM

## 2024-12-02 DIAGNOSIS — R82.3 HEMOGLOBINURIA: ICD-10-CM

## 2024-12-02 DIAGNOSIS — R35.1 NOCTURIA: ICD-10-CM

## 2024-12-02 LAB
POC APPEARANCE, URINE: CLEAR
POC BILIRUBIN, URINE: NEGATIVE
POC BLOOD, URINE: ABNORMAL
POC COLOR, URINE: YELLOW
POC GLUCOSE, URINE: NEGATIVE MG/DL
POC KETONES, URINE: NEGATIVE MG/DL
POC LEUKOCYTES, URINE: ABNORMAL
POC NITRITE,URINE: NEGATIVE
POC PH, URINE: 6.5 PH
POC PROTEIN, URINE: NEGATIVE MG/DL
POC SPECIFIC GRAVITY, URINE: 1.02
POC UROBILINOGEN, URINE: 0.2 EU/DL

## 2024-12-02 PROCEDURE — 81003 URINALYSIS AUTO W/O SCOPE: CPT | Mod: QW | Performed by: UROLOGY

## 2024-12-02 PROCEDURE — 1036F TOBACCO NON-USER: CPT | Performed by: UROLOGY

## 2024-12-02 PROCEDURE — 99211 OFF/OP EST MAY X REQ PHY/QHP: CPT | Performed by: UROLOGY

## 2024-12-02 PROCEDURE — 3008F BODY MASS INDEX DOCD: CPT | Performed by: UROLOGY

## 2024-12-02 SDOH — ECONOMIC STABILITY: FOOD INSECURITY: WITHIN THE PAST 12 MONTHS, YOU WORRIED THAT YOUR FOOD WOULD RUN OUT BEFORE YOU GOT MONEY TO BUY MORE.: NEVER TRUE

## 2024-12-02 SDOH — ECONOMIC STABILITY: FOOD INSECURITY: WITHIN THE PAST 12 MONTHS, THE FOOD YOU BOUGHT JUST DIDN'T LAST AND YOU DIDN'T HAVE MONEY TO GET MORE.: NEVER TRUE

## 2024-12-02 ASSESSMENT — LIFESTYLE VARIABLES
HOW OFTEN DO YOU HAVE A DRINK CONTAINING ALCOHOL: MONTHLY OR LESS
SKIP TO QUESTIONS 9-10: 1
HOW MANY STANDARD DRINKS CONTAINING ALCOHOL DO YOU HAVE ON A TYPICAL DAY: 1 OR 2
HOW OFTEN DO YOU HAVE SIX OR MORE DRINKS ON ONE OCCASION: NEVER
AUDIT-C TOTAL SCORE: 1

## 2024-12-02 ASSESSMENT — ENCOUNTER SYMPTOMS
DEPRESSION: 0
OCCASIONAL FEELINGS OF UNSTEADINESS: 0
LOSS OF SENSATION IN FEET: 0

## 2024-12-02 ASSESSMENT — PAIN SCALES - GENERAL: PAINLEVEL_OUTOF10: 0-NO PAIN

## 2024-12-02 NOTE — LETTER
December 3, 2024     Brittany Cardenas MD  [address]  [address]  [facility] [address]    Patient: Yumi Esqueda   YOB: 1960   Date of Visit: 12/2/2024       Dear Dr. Brittany Cardenas MD:    Thank you for referring Yumi Esqueda to me for evaluation. Below are my notes for this consultation.  If you have questions, please do not hesitate to call me. I look forward to following your patient along with you.       Sincerely,     Kishan Thomas MD      CC: No Recipients  ______________________________________________________________________________________    Subjective   Patient ID: Yumi Esqueda is a 64 y.o. female who presents for A F/U AFTER UNDERGOING A CYSTO, INTRAVESICAL INJECTION  UNITS ON 9-3-24--  PT'S URGENCY AND URGE INCONTINENCE ARE MUCH BETTER. PT IS 90% BETTER OVER HER BASELINE SXS  HPI  Are you experiencing:  Burning on urination --NO  Pain on urination  -- NO  Urinary frequency -- MUCH BETTER  Urinary urgency -- NO  Urge incontinence --NO  Urinary stress incontinence  --NO   Number of pads used per day -- NONE  Eneuresis -- NO  Nocturia-- 2 X ON AVG  Hematuria --NO  Hesitancy --NO  Post void fullness --  NO    PVR -- 0 ML    URINALYSIS DIPSTICK-- MOD HEMOGLOBINURIA, TRACE OF LEUKOCYTES    A:  PT S/P CYSTO, INTRAVESICAL INJECTION  UNITS OF BOTOX 9-3-24-- DOING WELL OVERALL   P:  REASSURANCE, EDUCATION, SUPPORTIVE CARE RENDERED TO THE PT   F/U WHEN THE VOIDING SXS RETURN    Kishan Thomas MD 11/17/24 12:40 PM

## (undated) DEVICE — Device

## (undated) DEVICE — NEEDLE, INJETAK ADJUSTABLE TIP

## (undated) DEVICE — SYRINGE, 60 CC, IRRIGATION, PISTON, CATH TIP, W/LUER ADAPTER,DISP